# Patient Record
Sex: FEMALE | Race: WHITE | NOT HISPANIC OR LATINO | URBAN - METROPOLITAN AREA
[De-identification: names, ages, dates, MRNs, and addresses within clinical notes are randomized per-mention and may not be internally consistent; named-entity substitution may affect disease eponyms.]

---

## 2018-10-24 ENCOUNTER — ANESTHESIA - HEALTHEAST (OUTPATIENT)
Dept: SURGERY | Facility: AMBULATORY SURGERY CENTER | Age: 58
End: 2018-10-24

## 2018-10-25 ASSESSMENT — MIFFLIN-ST. JEOR: SCORE: 1231.32

## 2018-10-26 ENCOUNTER — SURGERY - HEALTHEAST (OUTPATIENT)
Dept: SURGERY | Facility: AMBULATORY SURGERY CENTER | Age: 58
End: 2018-10-26

## 2018-10-26 ASSESSMENT — MIFFLIN-ST. JEOR: SCORE: 1195.04

## 2018-11-16 ASSESSMENT — MIFFLIN-ST. JEOR: SCORE: 1217.72

## 2018-11-19 ENCOUNTER — RECORDS - HEALTHEAST (OUTPATIENT)
Dept: ADMINISTRATIVE | Facility: OTHER | Age: 58
End: 2018-11-19

## 2018-11-20 ENCOUNTER — ANESTHESIA - HEALTHEAST (OUTPATIENT)
Dept: SURGERY | Facility: HOSPITAL | Age: 58
End: 2018-11-20

## 2018-11-20 ENCOUNTER — SURGERY - HEALTHEAST (OUTPATIENT)
Dept: SURGERY | Facility: HOSPITAL | Age: 58
End: 2018-11-20

## 2020-09-26 ENCOUNTER — AMBULATORY - HEALTHEAST (OUTPATIENT)
Dept: NURSING | Facility: CLINIC | Age: 60
End: 2020-09-26

## 2020-09-26 ENCOUNTER — COMMUNICATION - HEALTHEAST (OUTPATIENT)
Dept: TELEHEALTH | Facility: CLINIC | Age: 60
End: 2020-09-26

## 2021-06-02 VITALS — BODY MASS INDEX: 24.75 KG/M2 | WEIGHT: 145 LBS | HEIGHT: 64 IN

## 2021-06-02 VITALS — WEIGHT: 140 LBS | BODY MASS INDEX: 23.9 KG/M2 | HEIGHT: 64 IN

## 2021-06-21 NOTE — ANESTHESIA POSTPROCEDURE EVALUATION
Patient: Tatyana Esquivel  PLATE FIXATION 1ST METATARSAL LEFT FOOT  Anesthesia type: MAC    Patient location: Phase II Recovery  Last vitals:   Vitals:    11/20/18 0843   BP: 101/57   Pulse: 71   Resp: 16   Temp: 36.9  C (98.5  F)   SpO2: 98%     Post vital signs: stable  Level of consciousness: awake and responds to simple questions  Post-anesthesia pain: pain controlled  Post-anesthesia nausea and vomiting: no  Pulmonary: unassisted, return to baseline  Cardiovascular: stable and blood pressure at baseline  Hydration: adequate  Anesthetic events: no    QCDR Measures:  ASA# 11 - Rossy-op Cardiac Arrest: ASA11B - Patient did NOT experience unanticipated cardiac arrest  ASA# 12 - Rossy-op Mortality Rate: ASA12B - Patient did NOT die  ASA# 13 - PACU Re-Intubation Rate: NA - No ETT / LMA used for case  ASA# 10 - Composite Anes Safety: ASA10A - No serious adverse event    Additional Notes:

## 2021-06-21 NOTE — ANESTHESIA POSTPROCEDURE EVALUATION
"Patient: Tatyana Light BUNIONECTOMY LEFT FOOT  Anesthesia type: MAC    Patient location: Phase II Recovery  Last vitals:   Vitals:    10/26/18 1450   BP:    Pulse: 67   Resp:    Temp:    SpO2: 100%     Post vital signs: stable  Level of consciousness: awake and responds to simple questions  Post-anesthesia pain: pain controlled  Post-anesthesia nausea and vomiting: no  Pulmonary: unassisted, return to baseline  Cardiovascular: stable and blood pressure at baseline  Hydration: adequate  Anesthetic events: no    QCDR Measures:  ASA# 11 - Rossy-op Cardiac Arrest: ASA11B - Patient did NOT experience unanticipated cardiac arrest  ASA# 12 - Rossy-op Mortality Rate: ASA12B - Patient did NOT die  ASA# 13 - PACU Re-Intubation Rate: NA - No ETT / LMA used for case  ASA# 10 - Composite Anes Safety: ASA10A - No serious adverse event    Additional Notes:      Vitals:    10/26/18 1047 10/26/18 1324 10/26/18 1330 10/26/18 1450   BP: 123/78 96/51 100/63    Pulse: 72 74 73 67   Resp: 16 14 16    Temp: 37.2  C (99  F) 36.6  C (97.8  F)     TempSrc: Temporal Temporal     SpO2: 100% 99% 97% 100%   Weight: 140 lb (63.5 kg)      Height: 5' 4\" (1.626 m)        "

## 2021-06-21 NOTE — ANESTHESIA PREPROCEDURE EVALUATION
Anesthesia Evaluation      Patient summary reviewed     Airway   Mallampati: II   Pulmonary - negative ROS and normal exam                          Cardiovascular - negative ROS and normal exam   Neuro/Psych - negative ROS     Endo/Other - negative ROS      GI/Hepatic/Renal       Other findings: Hb 13.1      Dental - normal exam                        Anesthesia Plan  Planned anesthetic: MAC    ASA 1     Anesthetic plan and risks discussed with: patient and spouse    Post-op plan: routine recovery

## 2021-06-21 NOTE — ANESTHESIA PREPROCEDURE EVALUATION
Anesthesia Evaluation      Patient summary reviewed     Airway   Mallampati: II  Neck ROM: full   Pulmonary - negative ROS and normal exam   (-) not a smoker                         Cardiovascular - negative ROS  Exercise tolerance: > or = 4 METS  (-) angina  Rhythm: regular        Neuro/Psych    (+) depression, anxiety/panic attacks,     Endo/Other - negative ROS      GI/Hepatic/Renal - negative ROS   (-) GERD     Other findings:     NPO > 8 hrs        Dental - normal exam                        Anesthesia Plan  Planned anesthetic: MAC    ASA 2     Anesthetic plan and risks discussed with: patient and spouse  Anesthesia plan special considerations: antiemetics,   Post-op plan: routine recovery      Karen Mroeno MD  Staff Anesthesiologist  [facility]  11/20/18

## 2021-06-21 NOTE — ANESTHESIA CARE TRANSFER NOTE
Last vitals:   Vitals:    11/20/18 0843   BP: 101/57   Pulse: 71   Resp: 16   Temp: 36.9  C (98.5  F)   SpO2: 98%     Patient's level of consciousness is drowsy  Spontaneous respirations: yes  Maintains airway independently: yes  Dentition unchanged: yes  Oropharynx: oropharynx clear of all foreign objects    QCDR Measures:  ASA# 20 - Surgical Safety Checklist: WHO surgical safety checklist completed prior to induction    PQRS# 430 - Adult PONV Prevention: 4558F - Pt received => 2 anti-emetic agents (different classes) preop & intraop  ASA# 8 - Peds PONV Prevention: NA - Not pediatric patient, not GA or 2 or more risk factors NOT present  PQRS# 424 - Rossy-op Temp Management: 4559F - At least one body temp DOCUMENTED => 35.5C or 95.9F within required timeframe  PQRS# 426 - PACU Transfer Protocol: - Transfer of care checklist used  ASA# 14 - Acute Post-op Pain: ASA14B - Patient did NOT experience pain >= 7 out of 10

## 2021-06-21 NOTE — ANESTHESIA CARE TRANSFER NOTE
Last vitals:   Vitals:    10/26/18 1324   BP: 96/51   Pulse: 74   Resp: 14   Temp: 36.6  C (97.8  F)   SpO2: 99%     Patient's level of consciousness is drowsy  Spontaneous respirations: yes  Maintains airway independently: yes  Dentition unchanged: yes  Oropharynx: oropharynx clear of all foreign objects    QCDR Measures:  ASA# 20 - Surgical Safety Checklist: WHO surgical safety checklist completed prior to induction  PQRS# 430 - Adult PONV Prevention: 4558F - Pt received => 2 anti-emetic agents (different classes) preop & intraop  ASA# 8 - Peds PONV Prevention: NA - Not pediatric patient, not GA or 2 or more risk factors NOT present  PQRS# 424 - Rossy-op Temp Management: 4559F - At least one body temp DOCUMENTED => 35.5C or 95.9F within required timeframe  PQRS# 426 - PACU Transfer Protocol: - Transfer of care checklist used  ASA# 14 - Acute Post-op Pain: ASA14B - Patient did NOT experience pain >= 7 out of 10

## 2024-04-03 ENCOUNTER — TRANSFERRED RECORDS (OUTPATIENT)
Dept: MULTI SPECIALTY CLINIC | Facility: CLINIC | Age: 64
End: 2024-04-03

## 2024-04-16 ENCOUNTER — TRANSFERRED RECORDS (OUTPATIENT)
Dept: MULTI SPECIALTY CLINIC | Facility: CLINIC | Age: 64
End: 2024-04-16

## 2025-01-10 PROBLEM — Z80.41 FH: OVARIAN CANCER IN FIRST DEGREE RELATIVE: Status: ACTIVE | Noted: 2022-06-17

## 2025-01-10 PROBLEM — Z80.3 FH: BREAST CANCER IN FIRST DEGREE RELATIVE WHEN <50 YEARS OLD: Status: ACTIVE | Noted: 2022-06-17

## 2025-01-10 PROBLEM — Z00.00 PREVENTATIVE HEALTH CARE: Status: ACTIVE | Noted: 2025-01-10

## 2025-01-21 ENCOUNTER — VIRTUAL VISIT (OUTPATIENT)
Dept: ONCOLOGY | Facility: CLINIC | Age: 65
End: 2025-01-21
Attending: PHYSICIAN ASSISTANT
Payer: COMMERCIAL

## 2025-01-21 DIAGNOSIS — Z80.41 FH: OVARIAN CANCER IN FIRST DEGREE RELATIVE: ICD-10-CM

## 2025-01-21 DIAGNOSIS — Z80.3 FH: BREAST CANCER IN FIRST DEGREE RELATIVE WHEN <50 YEARS OLD: ICD-10-CM

## 2025-01-21 PROCEDURE — 96041 GENETIC COUNSELING SVC EA 30: CPT | Mod: GT,95 | Performed by: GENETIC COUNSELOR, MS

## 2025-01-21 NOTE — NURSING NOTE
Current patient location: [address] [place] [id number]      Is the patient currently in the state of [place]? YES    Visit mode:VIDEO    If the visit is dropped, the patient can be reconnected by: VIDEO VISIT: Send to e-mail at: [email or web address]    Will anyone else be joining the visit? NO  (If patient encounters technical issues they should call [phone]    How would you like to obtain your AVS? MyChart    Are changes needed to the allergy or medication list? N/A    Reason for visit: Consult      Yanet AVALOS

## 2025-01-21 NOTE — PATIENT INSTRUCTIONS
Assessing Cancer Risk  Cancer is a common diagnosis which impacts many families.  Individuals may develop cancer due to environmental factors (such as exposures and lifestyle), aging, genetic predisposition, or a combination of these factors.  The vast majority of cancer diagnoses are considered sporadic, and not primarily due to an inherited factor. Approximately 5-10% of cancer diagnoses are thought to be caused by inherited risk factors.       Many of the genes we are born with impact our risk of certain diseases, such as cancer.  When one of these genes is not working properly due to a mistake (known as a  mutation  or  variant ), this may lead to an increased risk of developing cancer.      Families impacted by a hereditary cancer syndrome are more likely to have relatives across several generations diagnosed with cancer, earlier ages of diagnoses (prior to age 50), certain rare tumors, or relatives diagnosed with multiple primary cancers.  However, this may not be the case for all families which carry a cancer risk gene, such as those with small family size or limited history information.         Genetic Testing  For some families, genetic testing may help to explain why their cancer developed, provide tailored management options, and clarify the risk of developing cancer in the future.      Genetic testing involves a simple blood or saliva test and will look at the genetic information in select genes for variants associated with cancer risk.  This testing may include analysis of a single gene due to a known variant in the family, multiple genes most associated with the cancers in a family, or an expanded panel of genes related to many types of cancers.    Results  There are several possible genetic test results, including:   Positive--a harmful mutation (also known as a  pathogenic  or  likely pathogenic  variant) was identified in a gene associated with increased cancer risk.  These risks, as well as  medical management options, depend on the specific genetic variant identified.    Negative--no variants were identified in the genes analyzed   Variant of unknown significance--a variant was identified in one or more genes, though it is currently unclear how this impacts cancer risk in the family.  Genetic testing labs are working to collect evidence about these uncertain variants and may provide updates in the future.    Medical Management  If a harmful mutation is found in a cancer risk gene, there may be increased cancer surveillance or preventative surgeries that can be offered. This information will be discussed after genetic testing is completed. If no mutations are found on genetic testing, screening is often recommended based on personal and/or family history of cancer. All cancer risk management options should be discussed in more detail with an individual's medical providers.    Inheritance   Variants in most cancer risk genes are inherited in an autosomal dominant pattern.  This means that if a parent has a variant, each of their children will have a 50% chance of inheriting that same variant.  Therefore, each child would have a 50% chance of being at increased risk for developing cancer.    Some cancer risk genes are inherited in an autosomal recessive pattern.  These risks are present when an individual inherits mutations from both parents in the same gene.         Genetic Information Nondiscrimination Act (SHELLEY)  The Genetic Information Nondiscrimination Act of 2008 (SHELLEY) is a federal law that protects individuals from health insurance or employment discrimination based on a genetic test result alone (with some exceptions, including employers with fewer than 15 employees, and ).  However, SHELLEY's protection does not cover life insurance, long term care, or disability insurances.  The [facility] is a great resource to learn more.    Questions to Think About  Regarding Genetic Testing  What effect will the test result have on me and my relationship with my family members if I have an inherited gene mutation?  If I don't have a gene mutation?  Should I share my test results, and how will my family react to this news, which may also affect them?  Are my children ready to learn new information that may one day affect their own health?      TURNAROUND TIME  4 - 6 weeks    INSURANCE COVERAGE   A prior authorization will be submitted when your provider submits the order for this panel. Testing will be held until prior authorization is obtained. You will be contacted once prior authorization is completed. For details regarding coverage and out-of-pocket estimates, please contact a  at the [facility] at [phone].    About the [facility]), [facility]  In collaborative effort with the [facility] and the [place] [employer], the [facility] offers a full range of diagnostic testing services, with a strong focus on quality, accuracy, and timeliness.    Please call us if you have any questions or concerns   (Appointments: [phone])    Rogelio Denney, MS [facility]  [phone]  Tiffani Pereira, MS, [facility] [phone]  Tamiko Ba, MS, [facility]  [phone]  Debbie Santiago, MS, [facility]  [phone]  Latoya Justin, MS, [facility]  [phone]  Talita Solo, MS, [facility]  [phone]  Jeniffer Abrams, MS, [facility]  [phone]

## 2025-01-21 NOTE — LETTER
1/21/2025      Tatyana Esquviel  [address] [place] [address]      Dear Colleague,    Thank you for referring your patient, Tatyana Esquivel, to the [facility]. Please see a copy of my visit note below.    Virtual Visit Details    Type of service:  Video Visit   Joined the call at 1/21/2025, 8:59:52 am.  Left the call at 1/21/2025, 9:25:05 am.  Originating Location (pt. Location): Home  Distant Location (provider location):  Off-site  Platform used for Video Visit: [employer]    1/21/2025    Referring Provider: Yoon Eaton PA-C    Presenting Information:   I met with Tatyana Esquivel today for genetic counseling as part of the Cancer Risk Management Program (virtual visit) to discuss her family history of cancer.  She is here today to review this history, cancer screening recommendations, and available genetic testing options.    Personal History:  Tatyana is a 64 year old female. She does not have any personal history of cancer.  Her first menstrual period was at age 16, and she reports going through menopause around age 56.  She reports having her ovaries removed at age 62 (BSO) following her sister's diagnosis of ovarian cancer.      Family History: (Please see scanned pedigree for detailed family history information)  Sister (68) diagnosed with ovarian cancer (65)  Brother diagnosed with penile cancer (64)  Mother diagnosed with breast cancer (46) and passed away when 53  Paternal grandfather was diagnosed with lung cancer and passed away when 82  Paternal first cousin had a history of leukemia  Her reported ethnicity is [place].      Discussion:  The features of sporadic, familial, and hereditary cancers were discussed, as it pertains to Tatyana's history of cancer. Tatyana's family history of breast and ovarian cancer is suggestive of a hereditary cancer syndrome.  A detailed handout regarding hereditary cancer and the information we discussed can be found in the after visit summary. Topics  included: inheritance pattern, cancer risks, cancer screening recommendations, as well as risks, benefits and limitations of testing.  Tatyana meets current National Comprehensive Cancer Network (NCCN) criteria for genetic testing of hereditary breast and ovarian cancer syndrome (BRCA1 and BRCA2).    We discussed that there are additional genes that could cause increased risk for the cancers in Tatyana's family. As many of these genes present with overlapping features in a family and accurate cancer risk cannot always be established based upon the pedigree analysis alone, it would be reasonable for aTtyana to consider panel genetic testing to analyze multiple genes at once.  The information from genetic testing may determine additional cancer screening and risk management options, including earlier/more frequent imaging, and possible risk reducing surgeries.  For individuals with an active cancer diagnosis, the results from genetic testing may guide targeted therapies (i.e. immunotherapy for individuals with Bowden syndrome, PARP inhibitors, etc.). These recommendations will be discussed in detail once genetic testing is completed.   An individual's personal and/or family history of cancer may be explained by more than one inherited cancer syndrome.  We reviewed available genetic testing options to address this history, including a tailored disease-focused panel, and expanded multi-cancer panels. Discussion included risks, benefits and limitations of testing.  Tatyana elected to proceed with genetic testing today.    The purpose of this genetic testing is to determine if Tatyana carries an inherited variant(s) associated with increased risk to develop cancer.  Genes included on this test may be associated with multiple types of cancer and are also associated with varying levels of cancer risk.  Depending on these cancer risks, specific screening and medical management recommendations may be available.  Possible results from  testing typically include  positive  (pathogenic/likely pathogenic variant),  negative  (normal), or  variant of unknown significance  (VUS).  Genetic test results have implications for Tatyana's family. Should a variant be identified, close relatives may also have a risk to carry the genetic variant. Some of these genes may have additional reproductive risks and options for further testing.    There are federal laws in place that prohibit health insurers and employers from discriminating based on genetic information (for example, the Genetic Information Nondiscrimination Act (SHELLEY) of 2008; some exceptions to apply.   This protection does not cover life insurance, long term care, or disability insurances.   The informed consent process has occurred, as I have provided the patient with an explanation of genetic testing and risk management options. I discussed the risks, benefits and alternatives to testing. The patient was given the opportunity to ask questions, and their questions were answered. The patient has provided consent to germline genetic testing, and elected to proceed with the Expanded Hereditary Cancer Panel.       Genetic Testing: order placed for BRCA1/2, reflex to Expanded Hereditary Cancer Panel    Plan:  1) Tatyana elected to proceed with germline genetic testing, including BRCA1/2 and automatic reflex to the Expanded Hereditary Cancers Panel.  2) A blood draw will be scheduled at an [facility].    3) Tatyana will be contacted once testing is completed to schedule a return genetic counseling visit, in which the results from testing and medical management recommendations will be discussed.  Test turn around time: approximately 4-6 weeks.        Debbie Santiago MS, [facility]  Licensed, Certified Genetic Counselor      I spent 45 minutes on the date of the encounter doing chart review, history and exam, documentation and further activities as noted above.      Again, thank you for allowing me to  participate in the care of your patient.        Sincerely,        Debbie Santiago GC    Electronically signed

## 2025-01-21 NOTE — PROGRESS NOTES
Virtual Visit Details    Type of service:  Video Visit   Joined the call at 1/21/2025, 8:59:52 am.  Left the call at 1/21/2025, 9:25:05 am.  Originating Location (pt. Location): Home  Distant Location (provider location):  Off-site  Platform used for Video Visit: Haylee    1/21/2025    Referring Provider: Yoon Eaton PA-C    Presenting Information:   I met with Tatyana Esquivel today for genetic counseling as part of the Cancer Risk Management Program (virtual visit) to discuss her family history of cancer.  She is here today to review this history, cancer screening recommendations, and available genetic testing options.    Personal History:  Tatyana is a 64 year old female. She does not have any personal history of cancer.  Her first menstrual period was at age 16, and she reports going through menopause around age 56.  She reports having her ovaries removed at age 62 (BSO) following her sister's diagnosis of ovarian cancer.      Family History: (Please see scanned pedigree for detailed family history information)  Sister (68) diagnosed with ovarian cancer (65)  Brother diagnosed with penile cancer (64)  Mother diagnosed with breast cancer (46) and passed away when 53  Paternal grandfather was diagnosed with lung cancer and passed away when 82  Paternal first cousin had a history of leukemia  Her reported ethnicity is Pashto.      Discussion:  The features of sporadic, familial, and hereditary cancers were discussed, as it pertains to Tatyana's history of cancer. Tatyana's family history of breast and ovarian cancer is suggestive of a hereditary cancer syndrome.  A detailed handout regarding hereditary cancer and the information we discussed can be found in the after visit summary. Topics included: inheritance pattern, cancer risks, cancer screening recommendations, as well as risks, benefits and limitations of testing.  Tatyana meets current National Comprehensive Cancer Network (NCCN) criteria for genetic testing of  hereditary breast and ovarian cancer syndrome (BRCA1 and BRCA2).    We discussed that there are additional genes that could cause increased risk for the cancers in Tatyana's family. As many of these genes present with overlapping features in a family and accurate cancer risk cannot always be established based upon the pedigree analysis alone, it would be reasonable for Tatyana to consider panel genetic testing to analyze multiple genes at once.  The information from genetic testing may determine additional cancer screening and risk management options, including earlier/more frequent imaging, and possible risk reducing surgeries.  For individuals with an active cancer diagnosis, the results from genetic testing may guide targeted therapies (i.e. immunotherapy for individuals with Bowden syndrome, PARP inhibitors, etc.). These recommendations will be discussed in detail once genetic testing is completed.   An individual's personal and/or family history of cancer may be explained by more than one inherited cancer syndrome.  We reviewed available genetic testing options to address this history, including a tailored disease-focused panel, and expanded multi-cancer panels. Discussion included risks, benefits and limitations of testing.  Tatyana elected to proceed with genetic testing today.    The purpose of this genetic testing is to determine if Tatyana carries an inherited variant(s) associated with increased risk to develop cancer.  Genes included on this test may be associated with multiple types of cancer and are also associated with varying levels of cancer risk.  Depending on these cancer risks, specific screening and medical management recommendations may be available.  Possible results from testing typically include  positive  (pathogenic/likely pathogenic variant),  negative  (normal), or  variant of unknown significance  (VUS).  Genetic test results have implications for Tatyana's family. Should a variant be identified,  close relatives may also have a risk to carry the genetic variant. Some of these genes may have additional reproductive risks and options for further testing.    There are federal laws in place that prohibit health insurers and employers from discriminating based on genetic information (for example, the Genetic Information Nondiscrimination Act (SHELLEY) of 2008; some exceptions to apply.   This protection does not cover life insurance, long term care, or disability insurances.   The informed consent process has occurred, as I have provided the patient with an explanation of genetic testing and risk management options. I discussed the risks, benefits and alternatives to testing. The patient was given the opportunity to ask questions, and their questions were answered. The patient has provided consent to germline genetic testing, and elected to proceed with the Expanded Hereditary Cancer Panel.       Genetic Testing: order placed for BRCA1/2, reflex to Expanded Hereditary Cancer Panel    Plan:  1) Tatyana elected to proceed with germline genetic testing, including BRCA1/2 and automatic reflex to the Expanded Hereditary Cancers Panel.  2) A blood draw will be scheduled at an [facility] clinic.    3) Tatyana will be contacted once testing is completed to schedule a return genetic counseling visit, in which the results from testing and medical management recommendations will be discussed.  Test turn around time: approximately 4-6 weeks.        Debbie Santiago MS, [facility]  Licensed, Certified Genetic Counselor      I spent 45 minutes on the date of the encounter doing chart review, history and exam, documentation and further activities as noted above.

## 2025-01-22 ENCOUNTER — LAB (OUTPATIENT)
Dept: LAB | Facility: CLINIC | Age: 65
End: 2025-01-22
Payer: COMMERCIAL

## 2025-01-22 DIAGNOSIS — Z80.3 FH: BREAST CANCER IN FIRST DEGREE RELATIVE WHEN <50 YEARS OLD: ICD-10-CM

## 2025-01-22 DIAGNOSIS — Z13.220 SCREENING, LIPID: ICD-10-CM

## 2025-01-22 DIAGNOSIS — Z11.4 SCREENING FOR HIV (HUMAN IMMUNODEFICIENCY VIRUS): Primary | ICD-10-CM

## 2025-01-22 DIAGNOSIS — Z11.59 NEED FOR HEPATITIS C SCREENING TEST: ICD-10-CM

## 2025-01-22 DIAGNOSIS — Z80.41 FH: OVARIAN CANCER IN FIRST DEGREE RELATIVE: ICD-10-CM

## 2025-01-22 DIAGNOSIS — Z13.1 SCREENING FOR DIABETES MELLITUS: ICD-10-CM

## 2025-01-22 LAB
INTERPRETATION SERPL IEP-IMP: NORMAL
INTERPRETATION SERPL IEP-IMP: NORMAL
LAB PDF RESULT: NORMAL
LAB PDF RESULT: NORMAL
LAB TEST RESULTS REPORTED IN RPTPERIOD: NORMAL
LAB TEST RESULTS REPORTED IN RPTPERIOD: NORMAL
LOCATION OF TASK: NORMAL
LOCATION OF TASK: NORMAL
SEQUENCING METHOD PNL BLD/T: NORMAL
SEQUENCING METHOD PNL BLD/T: NORMAL
SPECIMEN TYPE: NORMAL
SPECIMEN TYPE: NORMAL

## 2025-01-22 PROCEDURE — 80061 LIPID PANEL: CPT

## 2025-01-22 PROCEDURE — 86803 HEPATITIS C AB TEST: CPT

## 2025-01-22 PROCEDURE — 82947 ASSAY GLUCOSE BLOOD QUANT: CPT

## 2025-01-22 PROCEDURE — 36415 COLL VENOUS BLD VENIPUNCTURE: CPT

## 2025-01-22 PROCEDURE — 87389 HIV-1 AG W/HIV-1&-2 AB AG IA: CPT

## 2025-01-23 LAB
CHOLEST SERPL-MCNC: 186 MG/DL
FASTING STATUS PATIENT QL REPORTED: YES
FASTING STATUS PATIENT QL REPORTED: YES
GLUCOSE SERPL-MCNC: 94 MG/DL (ref 70–99)
HCV AB SERPL QL IA: NONREACTIVE
HDLC SERPL-MCNC: 88 MG/DL
HIV 1+2 AB+HIV1 P24 AG SERPL QL IA: NONREACTIVE
LDLC SERPL CALC-MCNC: 90 MG/DL
NONHDLC SERPL-MCNC: 98 MG/DL
TRIGL SERPL-MCNC: 39 MG/DL

## 2025-02-04 ENCOUNTER — MYC MEDICAL ADVICE (OUTPATIENT)
Dept: FAMILY MEDICINE | Facility: CLINIC | Age: 65
End: 2025-02-04
Payer: COMMERCIAL

## 2025-02-04 LAB — INTERPRETATION SERPL IEP-IMP: NORMAL

## 2025-02-13 ENCOUNTER — OFFICE VISIT (OUTPATIENT)
Dept: FAMILY MEDICINE | Facility: CLINIC | Age: 65
End: 2025-02-13
Payer: COMMERCIAL

## 2025-02-13 VITALS
RESPIRATION RATE: 14 BRPM | SYSTOLIC BLOOD PRESSURE: 106 MMHG | DIASTOLIC BLOOD PRESSURE: 76 MMHG | WEIGHT: 150.1 LBS | BODY MASS INDEX: 25.62 KG/M2 | OXYGEN SATURATION: 95 % | TEMPERATURE: 98.4 F | HEIGHT: 64 IN | HEART RATE: 81 BPM

## 2025-02-13 DIAGNOSIS — F51.01 PRIMARY INSOMNIA: Primary | ICD-10-CM

## 2025-02-13 DIAGNOSIS — Z80.41 FH: OVARIAN CANCER IN FIRST DEGREE RELATIVE: ICD-10-CM

## 2025-02-13 RX ORDER — ZOLPIDEM TARTRATE 5 MG/1
5 TABLET ORAL
Qty: 9 TABLET | Refills: 0 | Status: SHIPPED | OUTPATIENT
Start: 2025-02-13

## 2025-02-13 NOTE — PROGRESS NOTES
"  Assessment & Plan   Problem List Items Addressed This Visit       FH: ovarian cancer in first degree relative     1/21/2025 seen by genetics with the following recommendations:  Tatyana elected to proceed with germline genetic testing, including BRCA1/2 and automatic reflex to the Expanded Hereditary Cancers Panel.            Primary insomnia - Primary     Presents today for evaluation of insomnia when she travels.  She is going to [place] in March and states that she was previously prescribed Ambien which works well for her.  She is going to be gone for 9 days.  PDMP was reviewed today and there is been no misuse or abuse of the medication.  Ambien 5 g nightly a total of 9 pills was prescribed today.  She has tolerated the medication well in the past.  She is aware of the potential side effects.         Relevant Medications    zolpidem (AMBIEN) 5 MG tablet         BMI  Estimated body mass index is 25.76 kg/m  as calculated from the following:    Height as of this encounter: 1.626 m (5' 4\").    Weight as of this encounter: 68.1 kg (150 lb 1.6 oz).             Subjective   Tatyana is a 64 year old, presenting for the following health issues:  Insomnia (While on vacation)        2/13/2025     9:45 AM   Additional Questions   Roomed by ac   Accompanied by self     History of Present Illness       Reason for visit:  Meds for travel / sleep   She is taking medications regularly.             Objective    /76 (BP Location: Left arm, Patient Position: Sitting, Cuff Size: Adult Regular)   Pulse 81   Temp 98.4  F (36.9  C) (Oral)   Resp 14   Ht 1.626 m (5' 4\")   Wt 68.1 kg (150 lb 1.6 oz)   SpO2 95%   BMI 25.76 kg/m    Body mass index is 25.76 kg/m .  Physical Exam  Vitals and nursing note reviewed.   Constitutional:       Appearance: Normal appearance. She is normal weight.   Neurological:      Mental Status: She is alert.   Psychiatric:         Mood and Affect: Mood normal.         Behavior: Behavior normal.       "   Thought Content: Thought content normal.         Judgment: Judgment normal.                Signed Electronically by: Yoon Eaton PA-C

## 2025-02-13 NOTE — ASSESSMENT & PLAN NOTE
1/21/2025 seen by genetics with the following recommendations:  Tatyana elected to proceed with germline genetic testing, including BRCA1/2 and automatic reflex to the Expanded Hereditary Cancers Panel.

## 2025-02-13 NOTE — ASSESSMENT & PLAN NOTE
Presents today for evaluation of insomnia when she travels.  She is going to [place] in March and states that she was previously prescribed Ambien which works well for her.  She is going to be gone for 9 days.  PDMP was reviewed today and there is been no misuse or abuse of the medication.  Ambien 5 g nightly a total of 9 pills was prescribed today.  She has tolerated the medication well in the past.  She is aware of the potential side effects.

## 2025-02-28 PROBLEM — K57.32 DIVERTICULITIS OF COLON: Status: ACTIVE | Noted: 2025-02-28

## 2025-03-07 ENCOUNTER — LAB (OUTPATIENT)
Dept: LAB | Facility: CLINIC | Age: 65
End: 2025-03-07
Payer: COMMERCIAL

## 2025-03-07 DIAGNOSIS — Z80.3 FH: BREAST CANCER IN FIRST DEGREE RELATIVE WHEN <50 YEARS OLD: ICD-10-CM

## 2025-03-07 DIAGNOSIS — Z80.41 FH: OVARIAN CANCER IN FIRST DEGREE RELATIVE: Primary | ICD-10-CM

## 2025-03-07 PROCEDURE — G0452 MOLECULAR PATHOLOGY INTERPR: HCPCS | Mod: 26 | Performed by: STUDENT IN AN ORGANIZED HEALTH CARE EDUCATION/TRAINING PROGRAM

## 2025-03-07 PROCEDURE — 81162 BRCA1&2 GEN FULL SEQ DUP/DEL: CPT | Performed by: GENETIC COUNSELOR, MS

## 2025-03-31 ENCOUNTER — VIRTUAL VISIT (OUTPATIENT)
Dept: ONCOLOGY | Facility: CLINIC | Age: 65
End: 2025-03-31
Attending: GENETIC COUNSELOR, MS
Payer: COMMERCIAL

## 2025-03-31 DIAGNOSIS — Z80.41 FH: OVARIAN CANCER IN FIRST DEGREE RELATIVE: Primary | ICD-10-CM

## 2025-03-31 DIAGNOSIS — Z80.3 FH: BREAST CANCER IN FIRST DEGREE RELATIVE WHEN <50 YEARS OLD: ICD-10-CM

## 2025-03-31 PROCEDURE — 999N000069 HC STATISTIC GENETIC COUNSELING, < 16 MIN: Mod: GT,95 | Performed by: GENETIC COUNSELOR, MS

## 2025-03-31 NOTE — NURSING NOTE
Current patient location: [address] [place] [id number]    Is the patient currently in the state of [place]? YES    Visit mode: VIDEO    If the visit is dropped, the patient can be reconnected by:VIDEO VISIT: Text to cell phone:   Telephone Information:   Mobile [phone]       Will anyone else be joining the visit? Yes, I   (If patient encounters technical issues they should call [phone]    Are changes needed to the allergy or medication list? N/A    Are refills needed on medications prescribed by this physician? NO    Rooming Documentation:  Not applicable    Reason for visit: JANE AVALOS

## 2025-03-31 NOTE — LETTER
"3/31/2025      Tatyana Esquivel  [address] [place] [id number]      Dear Colleague,    Thank you for referring your patient, Tatyana sEquivel, to the [facility]. Please see a copy of my visit note below.    3/31/2025    Virtual Visit Details    Type of service:  Video Visit   Joined the call at 3/31/2025, 1:05:31 pm.  Left the call at 3/31/2025, 1:16:09 pm.  Originating Location (pt. Location): Home  Distant Location (provider location):  Off-site  Platform used for Video Visit: LiquidSpace    Presenting Information:  Tatyana Esquivel returned to the Cancer Risk Management Program for a follow up genetic counseling visit to discuss her genetic testing results. Please see previous genetic counseling note for additional details.      Genetic Testing Result: NEGATIVE  No pathogenic, likely pathogenic, or inconclusive variants were detected in any of the genes analyzed.  Testing included the Hereditary Cancer Comprehensive Expanded: ALK, APC, KIERAN, AXIN2, BAP1, BARD1, BLM, BMPR1A, BRCA1, BRCA2, BRIP1, CASR, CDC73, CDH1, CDK4, CDKN1B, CDKN1C, CDKN2A, CEBPA, CHEK2, CTNNA1, DICER1, DIS3L2, EGFR, EPCAM, FH, FLCN, GATA2, GPC3, GREM1, HOXB13, HRAS, KIT, MAX, MBD4, MEN1, MET, MITF, MLH1, MLH3, MRE11, MSH2, MSH3, MSH6, MUTYH, NBN, NF1, NF2, NTHL1, PALB2, PDGFRA, PHOX2B, PMS2, POLD1, POLE, POT1, [id number], PTCH1, PTCH2, PTEN, RAD50, RAD51C, RAD51D, RB1, RECQL4, RET, RUNX1, SDHA, SDHAF2, SDHB, SDHC, SDHD, SMAD4, SMARCA4, SMARCB1, SMARCE1, STK11, SUFU, TERC, TERT, [id number], TP53, TSC1, TSC2, VHL, WRN, WT1.      A copy of the test report can be found in the Laboratory tab, dated 3/7/2025, and named \"Next generation sequencing\".     Interpretation:  Testing did not detect any disease-causing changes in the genes analyzed.  The vast majority of cancer diagnoses are considered sporadic, and not primarily due to an inherited factor. Sporadic cancers may be caused by a combination of factors including lifestyle, " environmental exposures, certain medical conditions, and aging.  Most cancers occur by random chance, due to an accumulation of non-inherited genetic changes within our cells as we age.      We reviewed the possible limitations of our current testing technology.  It remains possible that a different gene, or combination of genetic and other risk factors may be present in Tatyana's family which could still contribute to increased cancer risks.        Tatyana completed genetic testing to address the family history of cancer:  Sister (68) diagnosed with ovarian cancer (65)  Brother diagnosed with penile cancer (64)  Mother diagnosed with breast cancer (46) and passed away when 53  Paternal grandfather was diagnosed with lung cancer and passed away when 82  Paternal first cousin had a history of leukemia    It remains possible that these relatives may have carried a variant in the genes tested which Tatyana did not inherit.  Tatyana is encouraged update our clinic if any of these relatives do pursue genetic testing, as their results may change our risk assessment.     Screening:  Tatyana and her family may still have an increased risk of developing cancer based on other possible factors, including both personal and family history.  In general, population cancer screening options (including those recommended by the American Cancer Society and the National Comprehensive Cancer Network (NCCN), are appropriate.  Earlier and more frequent screening may be considered for some families.  These recommendations should be discussed with an individuals care team.      These screening recommendations may change if there are changes to Tatyana's personal and/or family history of cancer. Final screening recommendations should be made by each individual's primary care provider.    Inheritance:  Variants in the genes tested can be inherited in an autosomal dominant manner. We discussed that Tatyana cannot/did not pass on an identifiable mutation in  these genes to her children based on this test result. Mutations in these genes do not skip generations.      Summary:  We do not have an explanation for Tatyana's family history of cancer. While no genetic changes were identified, Tatyana may still be at risk for certain cancers due to family history, environmental factors, or other genetic causes not identified by this test.  Because of that, it is important that she continue with cancer screening based on her personal and family history as discussed above.    Genetic testing is rapidly advancing, and new cancer susceptibility genes will most likely be identified in the future. Therefore, I encouraged Tatyana to contact me if there are changes in her personal or family history. This may change how we assess her cancer risk, screening, and the testing we would offer.    Summary:  1.  Germline genetic testing was NEGATIVE.    2. Cancer screening recommendations were reviewed based on personal and family history.    3. She should contact me periodically, or if her personal or family history of cancer changes, and she would like to know if there are additional screening or testing recommendations at that time.    Debbie Santiago MS, [facility] [place]  Licensed, Certified Genetic Counselor    I spent 15 minutes on the date of the encounter doing chart review, history and exam, documentation and further activities as noted above.            Again, thank you for allowing me to participate in the care of your patient.        Sincerely,        Debbie Santiago GC    Electronically signed

## 2025-03-31 NOTE — PATIENT INSTRUCTIONS
Negative Genetic Test Result    Genetic Testing  Genetic testing involved a blood or saliva test which looked at the genetic information in select genes for variants associated with cancer risk.  This testing may have included analysis of a single gene due to a known variant in the family, multiple genes most associated with the cancers in a family, or an expanded panel of genes related to many types of cancers.     Results  There are several possible genetic test results, including:   Positive--a harmful mutation (also known as a  pathogenic  or  likely pathogenic  variant) was identified in a gene associated with increased cancer risk.  These risks, as well as medical management options, depend on the specific genetic variant identified.    Negative--no variants were identified in the genes analyzed   Variant of unknown significance--a variant was identified in one or more genes, though it is currently unclear how this impacts cancer risk in the family.  Genetic testing labs are working to collect evidence about these uncertain variants and may provide updates in the future.    What Does a Negative Genetic Test Result Mean?  A negative genetic test results means that no genetic changes (variants) were detected in the genes tested. While no inherited risk factors for cancer were identified, you and your family may be at risk for certain cancers due to family history, environmental factors, or other genetic causes not identified by this test.  It is also possible that your family may still be at risk of carrying a genetic risk factor which you did not inherit. Your genetic counselor can help interpret the result for you and your relatives.      It is important to note which genes were included in your test. A list of these genes can be found on your test result.    Screening Recommendations  A combination of personal and family history factors may inform cancer risk and medical management recommendations.   Population cancer screening options, such as those recommended by the American Cancer Society and the National Comprehensive Cancer Network (NCCN) are appropriate for many families at average risk for cancer.  However, earlier and/or more frequent screening may be recommended based on personal factors (lifestyle, exposures, medications, screening results), family history of cancer, and sometimes genetic factors.  These cancer risk management options should be discussed in more detail with an individual's medical providers.      Please call us if you have any questions or concerns.   Cancer Risk Management Program (Appointments: [phone])  Rogelio Denney, MS [facility] [phone]  Tiffani Pereira, MS, [facility] [place] [phone]  Tamiko Ba, MS, [facility] [place]  [phone]  Debbie Santiago, MS, [facility] [place]  [phone]  Latoya Justin, MS, [facility]  [phone]  Talita Solo, MS, [facility] [phone]  Jeniffer Abrams, MS, [facility] [phone]

## 2025-03-31 NOTE — PROGRESS NOTES
"3/31/2025    Virtual Visit Details    Type of service:  Video Visit   Joined the call at 3/31/2025, 1:05:31 pm.  Left the call at 3/31/2025, 1:16:09 pm.  Originating Location (pt. Location): Home  Distant Location (provider location):  Off-site  Platform used for Video Visit: Haylee    Presenting Information:  Tatyana Esquivel returned to the Cancer Risk Management Program for a follow up genetic counseling visit to discuss her genetic testing results. Please see previous genetic counseling note for additional details.      Genetic Testing Result: NEGATIVE  No pathogenic, likely pathogenic, or inconclusive variants were detected in any of the genes analyzed.  Testing included the Hereditary Cancer Comprehensive Expanded: ALK, APC, KIERAN, AXIN2, BAP1, BARD1, BLM, BMPR1A, BRCA1, BRCA2, BRIP1, CASR, CDC73, CDH1, CDK4, CDKN1B, CDKN1C, CDKN2A, CEBPA, CHEK2, CTNNA1, DICER1, DIS3L2, EGFR, EPCAM, FH, FLCN, GATA2, GPC3, GREM1, HOXB13, HRAS, KIT, MAX, MBD4, MEN1, MET, MITF, MLH1, MLH3, MRE11, MSH2, MSH3, MSH6, MUTYH, NBN, NF1, NF2, NTHL1, PALB2, PDGFRA, PHOX2B, PMS2, POLD1, POLE, POT1, XZMRN2G, PTCH1, PTCH2, PTEN, RAD50, RAD51C, RAD51D, RB1, RECQL4, RET, RUNX1, SDHA, SDHAF2, SDHB, SDHC, SDHD, SMAD4, SMARCA4, SMARCB1, SMARCE1, STK11, SUFU, TERC, TERT, [id number], TP53, TSC1, TSC2, VHL, WRN, WT1.      A copy of the test report can be found in the Laboratory tab, dated 3/7/2025, and named \"Next generation sequencing\".     Interpretation:  Testing did not detect any disease-causing changes in the genes analyzed.  The vast majority of cancer diagnoses are considered sporadic, and not primarily due to an inherited factor. Sporadic cancers may be caused by a combination of factors including lifestyle, environmental exposures, certain medical conditions, and aging.  Most cancers occur by random chance, due to an accumulation of non-inherited genetic changes within our cells as we age.      We reviewed the possible limitations of our current " testing technology.  It remains possible that a different gene, or combination of genetic and other risk factors may be present in Tatyana's family which could still contribute to increased cancer risks.        Tatyana completed genetic testing to address the family history of cancer:  Sister (68) diagnosed with ovarian cancer (65)  Brother diagnosed with penile cancer (64)  Mother diagnosed with breast cancer (46) and passed away when 53  Paternal grandfather was diagnosed with lung cancer and passed away when 82  Paternal first cousin had a history of leukemia    It remains possible that these relatives may have carried a variant in the genes tested which Tatyana did not inherit.  Tatyana is encouraged update our clinic if any of these relatives do pursue genetic testing, as their results may change our risk assessment.     Screening:  Tatyana and her family may still have an increased risk of developing cancer based on other possible factors, including both personal and family history.  In general, population cancer screening options (including those recommended by the American Cancer Society and the National Comprehensive Cancer Network (NCCN), are appropriate.  Earlier and more frequent screening may be considered for some families.  These recommendations should be discussed with an individuals care team.      These screening recommendations may change if there are changes to Tatyana's personal and/or family history of cancer. Final screening recommendations should be made by each individual's primary care provider.    Inheritance:  Variants in the genes tested can be inherited in an autosomal dominant manner. We discussed that Tatyana cannot/did not pass on an identifiable mutation in these genes to her children based on this test result. Mutations in these genes do not skip generations.      Summary:  We do not have an explanation for Tatyana's family history of cancer. While no genetic changes were identified, Tatyana may  still be at risk for certain cancers due to family history, environmental factors, or other genetic causes not identified by this test.  Because of that, it is important that she continue with cancer screening based on her personal and family history as discussed above.    Genetic testing is rapidly advancing, and new cancer susceptibility genes will most likely be identified in the future. Therefore, I encouraged Tatyana to contact me if there are changes in her personal or family history. This may change how we assess her cancer risk, screening, and the testing we would offer.    Summary:  1.  Germline genetic testing was NEGATIVE.    2. Cancer screening recommendations were reviewed based on personal and family history.    3. She should contact me periodically, or if her personal or family history of cancer changes, and she would like to know if there are additional screening or testing recommendations at that time.    Debbie Santiago MS, [facility]  Licensed, Certified Genetic Counselor    I spent 15 minutes on the date of the encounter doing chart review, history and exam, documentation and further activities as noted above.

## 2025-04-03 ENCOUNTER — ANCILLARY ORDERS (OUTPATIENT)
Dept: MAMMOGRAPHY | Facility: HOSPITAL | Age: 65
End: 2025-04-03
Payer: COMMERCIAL

## 2025-04-03 DIAGNOSIS — Z12.31 VISIT FOR SCREENING MAMMOGRAM: Primary | ICD-10-CM

## 2025-04-07 ENCOUNTER — ANCILLARY ORDERS (OUTPATIENT)
Dept: RADIOLOGY | Facility: CLINIC | Age: 65
End: 2025-04-07

## 2025-04-07 ENCOUNTER — HOSPITAL ENCOUNTER (OUTPATIENT)
Dept: MAMMOGRAPHY | Facility: CLINIC | Age: 65
Discharge: HOME | End: 2025-04-07
Attending: PHYSICIAN ASSISTANT | Admitting: PHYSICIAN ASSISTANT
Payer: COMMERCIAL

## 2025-04-07 DIAGNOSIS — Z12.31 VISIT FOR SCREENING MAMMOGRAM: ICD-10-CM

## 2025-04-07 PROCEDURE — 77063 BREAST TOMOSYNTHESIS BI: CPT

## 2025-04-07 NOTE — ASSESSMENT & PLAN NOTE
Mother had breast cancer and  at age 53.  She did not have BRCA testing done   2025 seen by genetics with the following recommendations:  Tatyana elected to proceed with germline genetic testing, including BRCA1/2 and automatic reflex to the Expanded Hereditary Cancers Panel.     Negative Genetic testing!!!

## 2025-04-07 NOTE — ASSESSMENT & PLAN NOTE
1/21/2025 seen by genetics with the following recommendations:  Tatyana elected to proceed with germline genetic testing, including BRCA1/2 and automatic reflex to the Expanded Hereditary Cancers Panel.     Negative Genetic testing!!!

## 2025-04-08 ENCOUNTER — OFFICE VISIT (OUTPATIENT)
Dept: FAMILY MEDICINE | Facility: CLINIC | Age: 65
End: 2025-04-08
Payer: COMMERCIAL

## 2025-04-08 VITALS
OXYGEN SATURATION: 100 % | WEIGHT: 146.4 LBS | TEMPERATURE: 97.5 F | HEIGHT: 64 IN | HEART RATE: 75 BPM | SYSTOLIC BLOOD PRESSURE: 107 MMHG | RESPIRATION RATE: 16 BRPM | BODY MASS INDEX: 25 KG/M2 | DIASTOLIC BLOOD PRESSURE: 76 MMHG

## 2025-04-08 DIAGNOSIS — Z00.00 ROUTINE GENERAL MEDICAL EXAMINATION AT A HEALTH CARE FACILITY: ICD-10-CM

## 2025-04-08 DIAGNOSIS — F51.01 PRIMARY INSOMNIA: ICD-10-CM

## 2025-04-08 DIAGNOSIS — Z80.41 FH: OVARIAN CANCER IN FIRST DEGREE RELATIVE: ICD-10-CM

## 2025-04-08 DIAGNOSIS — Z00.00 PREVENTATIVE HEALTH CARE: Primary | ICD-10-CM

## 2025-04-08 DIAGNOSIS — Z80.3 FH: BREAST CANCER IN FIRST DEGREE RELATIVE WHEN <50 YEARS OLD: ICD-10-CM

## 2025-04-08 PROBLEM — Z87.19 HISTORY OF DIVERTICULITIS: Status: ACTIVE | Noted: 2025-02-28

## 2025-04-08 PROCEDURE — 90471 IMMUNIZATION ADMIN: CPT | Performed by: PHYSICIAN ASSISTANT

## 2025-04-08 PROCEDURE — 99396 PREV VISIT EST AGE 40-64: CPT | Mod: 25 | Performed by: PHYSICIAN ASSISTANT

## 2025-04-08 PROCEDURE — 99213 OFFICE O/P EST LOW 20 MIN: CPT | Mod: 25 | Performed by: PHYSICIAN ASSISTANT

## 2025-04-08 PROCEDURE — 90750 HZV VACC RECOMBINANT IM: CPT | Performed by: PHYSICIAN ASSISTANT

## 2025-04-08 NOTE — ASSESSMENT & PLAN NOTE
LMP:   Postmenopausal.  Pap: Previous Pap completed 4/26/2023.  Normal cytology.  HPV negative.    Mammogram:     Mammogram completed 4/7/25.    Colon cancer screen:    Colonoscopy completed 4/2024.   Immunizations: Influenza, COVID, shingles and pneumonia vaccine.  Lipids: Done 2/2025.  Glucose: Done 1/2025.  Dexa: Not indicated.

## 2025-04-08 NOTE — PROGRESS NOTES
"The longitudinal plan of care for the diagnosis(es)/condition(s) as documented were addressed during this visit. Due to the added complexity in care, I will continue to support Tatyana in the subsequent management and with ongoing continuity of care.    Assessment & Plan   Problem List Items Addressed This Visit       FH: breast cancer in first degree relative when <50 years old     Mother had breast cancer and  at age 53.  She did not have BRCA testing done   2025 seen by genetics with the following recommendations:  Tatyana elected to proceed with germline genetic testing, including BRCA1/2 and automatic reflex to the Expanded Hereditary Cancers Panel.     Negative Genetic testing!!!         FH: ovarian cancer in first degree relative     2025 seen by genetics with the following recommendations:  Tatyana elected to proceed with germline genetic testing, including BRCA1/2 and automatic reflex to the Expanded Hereditary Cancers Panel.     Negative Genetic testing!!!         Preventative health care - Primary     LMP:   Postmenopausal.  Pap: Previous Pap completed 2023.  Normal cytology.  HPV negative.    Mammogram:     Mammogram completed 25.    Colon cancer screen:    Colonoscopy completed 2024.   Immunizations: Influenza, COVID, shingles and pneumonia vaccine.  Lipids: Done 2025.  Glucose: Done 2025.  Dexa: Not indicated.         Primary insomnia     She uses ambien for insomnia related to travel.            Other Visit Diagnoses       Routine general medical examination at a health care facility                BMI  Estimated body mass index is 25.13 kg/m  as calculated from the following:    Height as of this encounter: 1.626 m (5' 4\").    Weight as of this encounter: 66.4 kg (146 lb 6.4 oz).       Subjective   Tatyana is a 64 year old, presenting for the following health issues:  Ear Problem (Ear pain more so on the right than the left x 2 wk, pain started after trip to [place]. )        " "4/8/2025    11:09 AM   Additional Questions   Roomed by Jim Patrick MA   Accompanied by Self         4/8/2025    11:09 AM   Patient Reported Additional Medications   Patient reports taking the following new medications None     History of Present Illness       Reason for visit:  Vaccine and ear pain   She is taking medications regularly.          Objective    /76 (BP Location: Left arm, Patient Position: Sitting, Cuff Size: Adult Regular)   Pulse 75   Temp 97.5  F (36.4  C) (Oral)   Resp 16   Ht 1.626 m (5' 4\")   Wt 66.4 kg (146 lb 6.4 oz)   SpO2 100%   BMI 25.13 kg/m    Body mass index is 25.13 kg/m .  Physical Exam  Vitals and nursing note reviewed.   Constitutional:       Appearance: Normal appearance. She is normal weight.   HENT:      Head: Normocephalic and atraumatic.      Right Ear: Tympanic membrane, ear canal and external ear normal.      Left Ear: Tympanic membrane, ear canal and external ear normal.      Mouth/Throat:      Mouth: Mucous membranes are moist.      Pharynx: Oropharynx is clear.   Eyes:      Conjunctiva/sclera: Conjunctivae normal.   Cardiovascular:      Rate and Rhythm: Normal rate and regular rhythm.      Pulses: Normal pulses.      Heart sounds: Normal heart sounds.   Pulmonary:      Effort: Pulmonary effort is normal.      Breath sounds: Normal breath sounds.   Musculoskeletal:      Cervical back: Normal range of motion and neck supple.   Skin:     General: Skin is warm and dry.   Neurological:      General: No focal deficit present.      Mental Status: She is alert and oriented to person, place, and time.   Psychiatric:         Mood and Affect: Mood normal.         Behavior: Behavior normal.         Thought Content: Thought content normal.         Judgment: Judgment normal.                Signed Electronically by: Yoon Eaton PA-C    "

## 2025-04-08 NOTE — PATIENT INSTRUCTIONS
Patient Education   Preventive Care Advice   This is general advice given by our system to help you stay healthy. However, your care team may have specific advice just for you. Please talk to your care team about your preventive care needs.  Nutrition  Eat 5 or more servings of fruits and vegetables each day.  Try wheat bread, brown rice and whole grain pasta (instead of white bread, rice, and pasta).  Get enough calcium and vitamin D. Check the label on foods and aim for 100% of the RDA (recommended daily allowance).  Lifestyle  Exercise at least 150 minutes each week  (30 minutes a day, 5 days a week).  Do muscle strengthening activities 2 days a week. These help control your weight and prevent disease.  No smoking.  Wear sunscreen to prevent skin cancer.  Have a dental exam and cleaning every 6 months.  Yearly exams  See your health care team every year to talk about:  Any changes in your health.  Any medicines your care team has prescribed.  Preventive care, family planning, and ways to prevent chronic diseases.  Shots (vaccines)   HPV shots (up to age 26), if you've never had them before.  Hepatitis B shots (up to age 59), if you've never had them before.  COVID-19 shot: Get this shot when it's due.  Flu shot: Get a flu shot every year.  Tetanus shot: Get a tetanus shot every 10 years.  Pneumococcal, hepatitis A, and RSV shots: Ask your care team if you need these based on your risk.  Shingles shot (for age 50 and up)  General health tests  Diabetes screening:  Starting at age 35, Get screened for diabetes at least every 3 years.  If you are younger than age 35, ask your care team if you should be screened for diabetes.  Cholesterol test: At age 39, start having a cholesterol test every 5 years, or more often if advised.  Bone density scan (DEXA): At age 50, ask your care team if you should have this scan for osteoporosis (brittle bones).  Hepatitis C: Get tested at least once in your life.  STIs (sexually  transmitted infections)  Before age 24: Ask your care team if you should be screened for STIs.  After age 24: Get screened for STIs if you're at risk. You are at risk for STIs (including HIV) if:  You are sexually active with more than one person.  You don't use condoms every time.  You or a partner was diagnosed with a sexually transmitted infection.  If you are at risk for HIV, ask about PrEP medicine to prevent HIV.  Get tested for HIV at least once in your life, whether you are at risk for HIV or not.  Cancer screening tests  Cervical cancer screening: If you have a cervix, begin getting regular cervical cancer screening tests starting at age 21.  Breast cancer scan (mammogram): If you've ever had breasts, begin having regular mammograms starting at age 40. This is a scan to check for breast cancer.  Colon cancer screening: It is important to start screening for colon cancer at age 45.  Have a colonoscopy test every 10 years (or more often if you're at risk) Or, ask your provider about stool tests like a FIT test every year or Cologuard test every 3 years.  To learn more about your testing options, visit:   .  For help making a decision, visit:   https://bit.ly/kh64381.  Prostate cancer screening test: If you have a prostate, ask your care team if a prostate cancer screening test (PSA) at age 55 is right for you.  Lung cancer screening: If you are a current or former smoker ages 50 to 80, ask your care team if ongoing lung cancer screenings are right for you.  For informational purposes only. Not to replace the advice of your health care provider. Copyright   2023 [employer]. All rights reserved. Clinically reviewed by the [facility] Transitions Program. [employer] [id number] - REV 01/24.

## 2025-08-02 ENCOUNTER — MYC MEDICAL ADVICE (OUTPATIENT)
Dept: FAMILY MEDICINE | Facility: CLINIC | Age: 65
End: 2025-08-02
Payer: COMMERCIAL

## 2025-08-04 DIAGNOSIS — Z12.83 SKIN CANCER SCREENING: Primary | ICD-10-CM

## 2025-08-05 ENCOUNTER — PATIENT OUTREACH (OUTPATIENT)
Dept: CARE COORDINATION | Facility: CLINIC | Age: 65
End: 2025-08-05
Payer: COMMERCIAL